# Patient Record
Sex: FEMALE | Race: WHITE | URBAN - METROPOLITAN AREA
[De-identification: names, ages, dates, MRNs, and addresses within clinical notes are randomized per-mention and may not be internally consistent; named-entity substitution may affect disease eponyms.]

---

## 2020-10-13 ENCOUNTER — OFFICE VISIT (OUTPATIENT)
Dept: URBAN - METROPOLITAN AREA CLINIC 27 | Facility: CLINIC | Age: 85
End: 2020-10-13
Payer: MEDICARE

## 2020-10-13 DIAGNOSIS — Z96.1 PRESENCE OF INTRAOCULAR LENS: ICD-10-CM

## 2020-10-13 DIAGNOSIS — H40.9 GLAUCOMA: ICD-10-CM

## 2020-10-13 DIAGNOSIS — H35.3122 NONEXUDATIVE AGE-RELATED MACULAR DEGENERATION, LEFT EYE, INTERMEDIATE DRY STAGE: Primary | ICD-10-CM

## 2020-10-13 PROCEDURE — 92134 CPTRZ OPH DX IMG PST SGM RTA: CPT | Performed by: OPHTHALMOLOGY

## 2020-10-13 PROCEDURE — 92004 COMPRE OPH EXAM NEW PT 1/>: CPT | Performed by: OPHTHALMOLOGY

## 2020-10-13 ASSESSMENT — INTRAOCULAR PRESSURE
OD: 31
OS: 8

## 2020-10-13 NOTE — IMPRESSION/PLAN
Impression: Nonexudative age-related macular degeneration, left eye, intermediate dry stage: H35.3122. Plan: Exam/OCT show drusen without IRF/SRF/heme.  Start AREDS/AG; RTC immediately prn dec VA, inc Sxs.

6 months, OCT OS

## 2020-10-13 NOTE — IMPRESSION/PLAN
Impression: Puckering of macula, left eye: H35.372. Plan: Exam/OCT show a moderate ERM. Given the patient's current visual acuity, observation was recommended at this time.

## 2021-05-25 ENCOUNTER — OFFICE VISIT (OUTPATIENT)
Dept: URBAN - METROPOLITAN AREA CLINIC 27 | Facility: CLINIC | Age: 86
End: 2021-05-25
Payer: MEDICARE

## 2021-05-25 DIAGNOSIS — H35.372 PUCKERING OF MACULA, LEFT EYE: ICD-10-CM

## 2021-05-25 PROCEDURE — 99213 OFFICE O/P EST LOW 20 MIN: CPT | Performed by: OPHTHALMOLOGY

## 2021-05-25 PROCEDURE — 92134 CPTRZ OPH DX IMG PST SGM RTA: CPT | Performed by: OPHTHALMOLOGY

## 2021-05-25 ASSESSMENT — INTRAOCULAR PRESSURE
OD: 32
OS: 9

## 2021-05-25 NOTE — IMPRESSION/PLAN
Impression: wet AMD OS Plan: Exam/OCT show new [address] [place] [address] c/w wet AMD OS. Discussed options of obs vs monthly Avastin, pt elects monthly Avastin to prevent additional VA loss. Pt needs authorization for treatment of this blinding condition, will submit. 

1 week, Avastin + AC tap OS (no dilation); then 1 month, reeval AMD OS

## 2021-05-25 NOTE — IMPRESSION/PLAN
Impression: ERM OS Plan: Exam/OCT show a moderate ERM. Given the patient's current visual acuity, observation was recommended at this time.

## 2021-05-25 NOTE — IMPRESSION/PLAN
Impression: Glaucoma: H40.9. Plan: ESOAG, managed by Dr. Matthew Mcelroy. Recommend AC tap with injections to prevent IOP spike.

## 2021-06-01 ENCOUNTER — PROCEDURE (OUTPATIENT)
Dept: URBAN - METROPOLITAN AREA CLINIC 41 | Facility: CLINIC | Age: 86
End: 2021-06-01
Payer: MEDICARE

## 2021-06-01 DIAGNOSIS — H35.3221 EXUDATIVE AGE-RELATED MACULAR DEGENERATION, LEFT EYE, WITH ACTIVE CHOROIDAL NEOVASCULARIZATION: Primary | ICD-10-CM

## 2021-06-01 PROCEDURE — 65800 DRAINAGE OF EYE: CPT | Performed by: OPHTHALMOLOGY

## 2021-06-01 PROCEDURE — 67028 INJECTION EYE DRUG: CPT | Performed by: OPHTHALMOLOGY

## 2021-06-01 ASSESSMENT — INTRAOCULAR PRESSURE
OD: 29
OS: 7

## 2021-07-06 ENCOUNTER — OFFICE VISIT (OUTPATIENT)
Dept: URBAN - METROPOLITAN AREA CLINIC 27 | Facility: CLINIC | Age: 86
End: 2021-07-06
Payer: MEDICARE

## 2021-07-06 PROCEDURE — 67028 INJECTION EYE DRUG: CPT | Performed by: OPHTHALMOLOGY

## 2021-07-06 PROCEDURE — 92134 CPTRZ OPH DX IMG PST SGM RTA: CPT | Performed by: OPHTHALMOLOGY

## 2021-07-06 ASSESSMENT — INTRAOCULAR PRESSURE
OS: 7
OD: 25

## 2021-07-06 NOTE — IMPRESSION/PLAN
Impression: Glaucoma: H40.9. Plan: ESOAG, managed by  [facility]. Recommend AC tap with injections to prevent IOP spike.

## 2021-07-06 NOTE — IMPRESSION/PLAN
Impression: wet AMD OS Plan: Exam/OCT show improved [facility] s/p Avastin last visit. Discussed options of obs vs monthly Avastin, pt elects monthly Avastin to prevent additional VA loss.   

1 month, reeval AMD OS

## 2021-08-03 ENCOUNTER — OFFICE VISIT (OUTPATIENT)
Dept: URBAN - METROPOLITAN AREA CLINIC 27 | Facility: CLINIC | Age: 86
End: 2021-08-03
Payer: MEDICARE

## 2021-08-03 PROCEDURE — 92134 CPTRZ OPH DX IMG PST SGM RTA: CPT | Performed by: OPHTHALMOLOGY

## 2021-08-03 PROCEDURE — 67028 INJECTION EYE DRUG: CPT | Performed by: OPHTHALMOLOGY

## 2021-08-03 PROCEDURE — 65800 DRAINAGE OF EYE: CPT | Performed by: OPHTHALMOLOGY

## 2021-08-03 PROCEDURE — 99212 OFFICE O/P EST SF 10 MIN: CPT | Performed by: OPHTHALMOLOGY

## 2021-08-03 ASSESSMENT — INTRAOCULAR PRESSURE
OS: 9
OD: 34

## 2021-08-03 NOTE — IMPRESSION/PLAN
Impression: Glaucoma: H40.9. Plan: ESOAG, managed by Dr. Johan Johnson. Recommend AC tap with injections to prevent IOP spike.

## 2021-09-28 ENCOUNTER — OFFICE VISIT (OUTPATIENT)
Dept: URBAN - METROPOLITAN AREA CLINIC 27 | Facility: CLINIC | Age: 86
End: 2021-09-28
Payer: MEDICARE

## 2021-09-28 PROCEDURE — 99212 OFFICE O/P EST SF 10 MIN: CPT | Performed by: OPHTHALMOLOGY

## 2021-09-28 PROCEDURE — 92134 CPTRZ OPH DX IMG PST SGM RTA: CPT | Performed by: OPHTHALMOLOGY

## 2021-09-28 PROCEDURE — 67028 INJECTION EYE DRUG: CPT | Performed by: OPHTHALMOLOGY

## 2021-09-28 PROCEDURE — 65800 DRAINAGE OF EYE: CPT | Performed by: OPHTHALMOLOGY

## 2021-09-28 ASSESSMENT — INTRAOCULAR PRESSURE
OD: 28
OS: 9

## 2021-09-28 NOTE — IMPRESSION/PLAN
Impression: wet AMD OS Plan: Exam/OCT show essentially resolved [address] s/p Avastin last visit. Recommend Avastin T+E to prevent recurrence and VA loss in this monocular patient.    

2 months, reeval AMD OS

## 2021-09-28 NOTE — IMPRESSION/PLAN
Impression: Glaucoma: H40.9. Plan: ESOAG, managed by Dr. Claire Karimi. Recommend AC tap with injections to prevent IOP spike.

## 2021-11-23 ENCOUNTER — OFFICE VISIT (OUTPATIENT)
Dept: URBAN - METROPOLITAN AREA CLINIC 27 | Facility: CLINIC | Age: 86
End: 2021-11-23
Payer: MEDICARE

## 2021-11-23 PROCEDURE — 99213 OFFICE O/P EST LOW 20 MIN: CPT | Performed by: OPHTHALMOLOGY

## 2021-11-23 PROCEDURE — 67028 INJECTION EYE DRUG: CPT | Performed by: OPHTHALMOLOGY

## 2021-11-23 PROCEDURE — 92134 CPTRZ OPH DX IMG PST SGM RTA: CPT | Performed by: OPHTHALMOLOGY

## 2021-11-23 ASSESSMENT — INTRAOCULAR PRESSURE
OS: 11
OD: 28

## 2021-11-23 NOTE — IMPRESSION/PLAN
Impression: ERM OS Plan: Exam shows a moderate ERM. Given the patient's current visual acuity, observation was recommended at this time.

## 2021-11-23 NOTE — IMPRESSION/PLAN
Impression: Glaucoma Plan: ESOAG, managed by Dr. Amara Liu. Recommend AC tap with injections to prevent IOP spike.

## 2021-11-23 NOTE — IMPRESSION/PLAN
Impression: wet AMD OS Plan: Exam/OCT show resolved [address] s/p Avastin last visit. Recommend Avastin T+E to prevent recurrence and VA loss in this monocular patient.    

3 months, reeval AMD OS

## 2022-03-07 ENCOUNTER — OFFICE VISIT (OUTPATIENT)
Dept: URBAN - METROPOLITAN AREA CLINIC 27 | Facility: CLINIC | Age: 87
End: 2022-03-07
Payer: MEDICARE

## 2022-03-07 PROCEDURE — 67028 INJECTION EYE DRUG: CPT | Performed by: OPHTHALMOLOGY

## 2022-03-07 PROCEDURE — 99213 OFFICE O/P EST LOW 20 MIN: CPT | Performed by: OPHTHALMOLOGY

## 2022-03-07 PROCEDURE — 92134 CPTRZ OPH DX IMG PST SGM RTA: CPT | Performed by: OPHTHALMOLOGY

## 2022-03-07 ASSESSMENT — INTRAOCULAR PRESSURE
OD: 21
OS: 7

## 2022-03-07 NOTE — IMPRESSION/PLAN
Impression: Glaucoma Plan: ESOAG, managed by Dr. Hoda Esteban. Recommend AC tap with injections to prevent IOP spike.

## 2022-03-07 NOTE — IMPRESSION/PLAN
Impression: wet AMD OS Plan: Exam/OCT show no recurrent [facility] OS. Recommend q12 week Avastin to prevent recurrence and VA loss in this monocular patient.    

3 months, reeval AMD OS